# Patient Record
(demographics unavailable — no encounter records)

---

## 2018-08-26 NOTE — EDM.PDOC
Scribed by Maria Elena Reddy 18 1811 for Jb Mullins PA





ED HPI GENERAL MEDICAL PROBLEM





- General


Chief Complaint: Cardiovascular Problem


Stated Complaint: IREGULAR HEART BEAT [id number]


Time Seen by Provider: 18 16:40


Source of Information: Reports: Patient, RN, RN Notes Reviewed


History Limitations: Reports: No Limitations





- History of Present Illness


INITIAL COMMENTS - FREE TEXT/NARRATIVE: 


Patient is a 46-year-old patient who has a history of WPW. He had Maise 

treatment in . He has had increased stress recently with a divorce and an 

aunt who  today. Patient works at [employer] today and developed chest discomfort 

and tightness in his chest about 1-1/2 hours ago. His cardiologist in [facility] in [place]. He last saw her in  and everything was good. He takes 

Dig 0.25 in the a.m. and 1/2 of 0.25 evening. Patient states he can feel the 

skipped beats.


Onset: Today


Duration: Constant


Location: Reports: Chest


Quality: Reports: Ache


Severity: Moderate


Improves with: Reports: None


Worsens with: Reports: None


Associated Symptoms: Reports: No Other Symptoms





- Related Data


 Allergies











Allergy/AdvReac Type Severity Reaction Status Date / Time


 


cyclobenzaprine HCl Allergy  Tachycardia Verified 18 16:24





[From Flexeril]     


 


meperidine HCl [From Demerol] Allergy  Vomiting Verified 18 16:24


 


morphine Allergy  Vomiting Verified 18 16:24


 


Sulfa (Sulfonamide Allergy  Itching Verified 18 16:24





Antibiotics)     


 


bee stings Allergy  Tachycardia Uncoded 18 16:24











Home Meds: 


 Home Meds





Digoxin [Digox] 0.5 tab PO QPM 10/04/14 [History]


Digoxin [Digox] 1 tab PO QAM 10/04/14 [History]


Lisinopril [Prinivil] 2 tab PO DAILY 10/04/14 [History]


Metoprolol Tartrate [Lopressor] 1 tab PO BID 10/04/14 [History]


Gabapentin [Neurontin] 600 mg PO TID 16 [History]


Meloxicam 15 mg PO DAILY 16 [History]


Methocarbamol 500 mg PO ASDIRECTED PRN 16 [History]


Multivitamin with Minerals [Multiple Vitamin] 1 tab PO DAILY 16 [History]


Omeprazole 20 mg PO DAILY 16 [History]


traZODone HCl [Trazodone HCl] 50 mg PO DAILY 18 [History]











Past Medical History


HEENT History: Reports: Impaired Vision


Other HEENT History: wears glasses


Cardiovascular History: Reports: CAD, Heart Valve Replacement, High Cholesterol

, Hypertension, Other (See Below) (WPW with Maise treatment in .)


Respiratory History: Reports: None


Gastrointestinal History: Reports: GERD


Genitourinary History: Reports: None


Musculoskeletal History: Reports: Back Pain, Chronic, Osteoarthritis


Neurological History: Reports: None


Psychiatric History: Reports: Depression


Endocrine/Metabolic History: Reports: None


Hematologic History: Reports: None


Immunologic History: Reports: None


Oncologic (Cancer) History: Reports: None


Dermatologic History: Reports: None





- Infectious Disease History


Infectious Disease History: Reports: Chicken Pox





- Past Surgical History


Head Surgeries/Procedures: Reports: None


GI Surgical History: Reports: Hernia, Inguinal


Musculoskeletal Surgical History: Reports: Carpal Tunnel, Other (See Below)


Other Musculoskeletal Surgeries/Procedures:: back, neck





Social & Family History





- Family History


Family Medical History: Noncontributory





- Tobacco Use


Smoking Status *Q: Never Smoker


Second Hand Smoke Exposure: No





- Caffeine Use


Caffeine Use: Reports: Soda





- Recreational Drug Use


Recreational Drug Use: No





- Living Situation & Occupation


Living situation: Reports: , with Family


Occupation: Employed





ED ROS GENERAL





- Review of Systems


Review Of Systems: ROS reveals no pertinent complaints other than HPI.





ED EXAM, GENERAL





- Physical Exam


Exam: See Below


Exam Limited By: No Limitations


General Appearance: Alert, WD/WN, No Apparent Distress


Eye Exam: Bilateral Eye: EOMI, Normal Inspection, PERRL


Ears: Normal External Exam, Normal Canal, Hearing Grossly Normal, Normal TMs


Nose: Normal Inspection, Normal Mucosa, No Blood


Throat/Mouth: Normal Inspection, Normal Lips, Normal Teeth, Normal Gums, Normal 

Oropharynx, Normal Voice, No Airway Compromise


Head: Atraumatic, Normocephalic


Neck: Normal Inspection, Supple, Non-Tender, Full Range of Motion


Respiratory/Chest: No Respiratory Distress, Lungs Clear


Cardiovascular: Other (Systolic murmur grade 3/6. Irregular irregular.)


GI/Abdominal: Normal Bowel Sounds, Soft, Non-Tender, No Organomegaly, No 

Distention, No Abnormal Bruit, No Mass


 (Male) Exam: Deferred


Rectal (Males) Exam: Deferred


Back Exam: Normal Inspection, Full Range of Motion, NT


Extremities: Normal Inspection, Normal Range of Motion, Non-Tender, Normal 

Capillary Refill, No Pedal Edema


Neurological: Alert, Oriented, CN II-XII Intact, Normal Cognition, Normal Gait, 

Normal Reflexes, No Motor/Sensory Deficits


Psychiatric: Normal Affect, Normal Mood


Skin Exam: Warm, Dry, Intact, Normal Color, No Rash


Lymphatic: No Adenopathy





Course





- Vital Signs


Last Recorded V/S: 


 Last Vital Signs











Temp  36.5 C   18 16:25


 


Pulse  68   18 16:25


 


Resp  16   18 16:25


 


BP  151/80 H  18 16:25


 


Pulse Ox  98   18 16:25














- Orders/Labs/Meds


Orders: 


 Active Orders 24 hr











 Category Date Time Status


 


 EKG Documentation Completion [RC] URGENT Care  18 16:23 Active


 


 Chest 1V Frontal [CR] Urgent Exams  18 16:23 Taken











Labs: 





 Laboratory Tests











  18 Range/Units





  16:34 16:34 16:34 


 


WBC  8.6    (5.0-10.0)  10^3/uL


 


RBC  4.17 L    (4.6-6.2)  10^6/uL


 


Hgb  12.6 L D    (14.0-18.0)  g/dL


 


Hct  37.6 L    (40.0-54.0)  %


 


MCV  90.2  D    ()  fL


 


MCH  30.2    (27.0-34.0)  pg


 


MCHC  33.5    (33.0-35.0)  g/dL


 


Plt Count  191    (150-450)  10^3/uL


 


Neut % (Auto)  59.3    (42.2-75.2)  %


 


Lymph % (Auto)  26.6    (20.5-50.1)  %


 


Mono % (Auto)  12.7 H    (2-8)  %


 


Eos % (Auto)  0.8 L    (1.0-3.0)  %


 


Baso % (Auto)  0.6    (0.0-1.0)  %


 


Sodium   138   (135-145)  mmol/L


 


Potassium   3.9   (3.6-5.0)  mmol/L


 


Chloride   102   (101-111)  mmol/L


 


Carbon Dioxide   29.0   (21.0-31.0)  mmol/L


 


Anion Gap   10.9   


 


BUN   24 H   (7-18)  mg/dL


 


Creatinine   0.9   (0.6-1.3)  mg/dL


 


Est Cr Clr Drug Dosing   95.89   mL/min


 


Estimated GFR (MDRD)   > 60   


 


BUN/Creatinine Ratio   26.66   


 


Glucose   88   ()  mg/dL


 


Calcium   8.7   (8.4-10.2)  mg/dl


 


Total Bilirubin   0.6   (0.2-1.0)  mg/dL


 


AST   27   (10-42)  IU/L


 


ALT   31   (10-60)  IU/L


 


Alkaline Phosphatase   100   ()  IU/L


 


Troponin I   < 0.02   (0.00-0.02)  ng/ml


 


Total Protein   7.7   (6.7-8.2)  g/dl


 


Albumin   4.1   (3.2-5.5)  g/dl


 


Globulin   3.6   


 


Albumin/Globulin Ratio   1.14   


 


Digoxin    1.1  (0-2.5)  ng/ml











Meds: 


Medications














Discontinued Medications














Generic Name Dose Route Start Last Admin





  Trade Name Freq  PRN Reason Stop Dose Admin


 


Aspirin  324 mg  18 16:40  18 16:44





  Aspirin  PO  18 16:41  324 mg





  ONETIME ONE   Administration





     





     





     





     














- Re-Assessments/Exams


Free Text/Narrative Re-Assessment/Exam: 





18 18:06


Consulted with [facility] Cardiologist regarding the patient's history, 

EKG, lab and x-ray results. Cardiology advised to have the patient contact his 

cardiologist tomorrow to have a holter monitor placed. 





Departure





- Departure


Time of Disposition: 18:08


Disposition: Home, Self-Care 01


Condition: Fair


Clinical Impression: 


 Finding of multiple premature atrial contractions by electrocardiography





Chest pain


Qualifiers:


 Chest pain type: unspecified Qualified Code(s): R07.9 - Chest pain, unspecified





Instructions:  Nonspecific Chest Pain, Easy-to-Read


Forms:  ED Department Discharge


Care Plan Goals: 


The patient was advised of the examination, lab, EKG and x-ray results during 

the visit. The patient was encouraged to follow-up with his cardiologist 

tomorrow for continued evaluation and management. If the patient has any 

additional symptoms or concerns, the patient should follow-up with his primary 

care facility or return to the emergency department. 





- My Orders


Last 24 Hours: 





My Active Orders





18 16:23


EKG Documentation Completion [RC] URGENT 


Chest 1V Frontal [CR] Urgent 














- Assessment/Plan


Last 24 Hours: 





My Active Orders





18 16:23


EKG Documentation Completion [RC] URGENT 


Chest 1V Frontal [CR] Urgent 














I have read and agree with the documentation that has been completed regarding 

this visit. By signing this record, I attest that the documentation was 

completed in my physical presence and is an accurate record of the encounter.

## 2018-08-27 NOTE — EKG
08/26/2018 - SARAH BLACKBURN -

 

TIME:  4:28 p.m.

 

FINDINGS:  As per my reading, sinus rhythm at 55, multiple atrial premature

complexes.

 

DD:  08/27/2018 15:20:18

DT:  08/27/2018 16:20:07

[facility]

Job #:  [id number]